# Patient Record
Sex: FEMALE | Race: WHITE
[De-identification: names, ages, dates, MRNs, and addresses within clinical notes are randomized per-mention and may not be internally consistent; named-entity substitution may affect disease eponyms.]

---

## 2020-08-27 ENCOUNTER — HOSPITAL ENCOUNTER (OUTPATIENT)
Dept: HOSPITAL 92 - TBSIIMAG | Age: 65
Discharge: HOME | End: 2020-08-27
Attending: INTERNAL MEDICINE
Payer: MEDICARE

## 2020-08-27 DIAGNOSIS — R16.0: Primary | ICD-10-CM

## 2020-08-27 DIAGNOSIS — K76.89: ICD-10-CM

## 2020-08-27 DIAGNOSIS — K86.89: ICD-10-CM

## 2020-08-27 DIAGNOSIS — N28.1: ICD-10-CM

## 2020-08-27 PROCEDURE — 74183 MRI ABD W/O CNTR FLWD CNTR: CPT

## 2020-08-27 NOTE — MRI
MRI OF THE ABDOMEN WITH AND WITHOUT CONTRAST:



INDICATION: 

History of abnormal CT examination.



COMPARISON:

Prior CT the abdomen and pelvis dated August 14, 2020.



TECHNIQUE: 

Multiplanar multisequence MR images were obtained of the abdomen utilizing a liver mass protocol.



TECHNIQUE:

Patient received 12 cc of MultiHance.



FINDINGS:

Small hypodensity within the left hepatic lobe demonstrates T2 hypointensity and T1 hypointensity wit
hout appreciable enhancement consistent with a tiny cyst. No additional focal hepatic lesion is

evident. Gallbladder is normal-appearing. There is a prominent cystic mass centered within the superi
or margin of the pancreatic head measuring 2.5 x 1.5 cm. The cystic mass appears to communicate

with the distal common bile duct, particularly on image 28 and 27 of series 13. There are some mild e
nhancing internal septations. No suspicious nodular enhancement is evident. No main pancreatic

ductal dilatation is evident. No lymphadenopathy is noted. The spleen is normal-appearing. No marrow 
signal abnormality is evident. Motion artifact limits evaluation of the coronal postcontrast

series. No free fluid is identified. There is a very tiny cysts within both kidneys.



IMPRESSION:

1. Multiloculated cystic abnormality in the pancreatic head appears to communicate with the distal co
mmon bile duct and is suspicious for choledochal cyst. Alternatively differential considerations

include a small serous cystadenoma or possibly an IPMN tumor. Would recommend consideration for ERCP 
to see if this truly does communicate with the distal common bile duct.

2. Small left hepatic lobe cyst.

3. Small bilateral renal cysts.



Transcribed Date/Time: 8/27/2020 11:09 AM



Reported By: Tayo Jaime 

Electronically Signed:  8/27/2020 3:46 PM

## 2021-04-08 ENCOUNTER — HOSPITAL ENCOUNTER (OUTPATIENT)
Dept: HOSPITAL 92 - CSHMRI | Age: 66
Discharge: HOME | End: 2021-04-08
Attending: INTERNAL MEDICINE
Payer: MEDICARE

## 2021-04-08 DIAGNOSIS — K86.2: Primary | ICD-10-CM

## 2021-04-08 LAB — ESTIMATED GFR-MDRD - POC: 72

## 2021-04-08 PROCEDURE — 74183 MRI ABD W/O CNTR FLWD CNTR: CPT

## 2021-04-08 PROCEDURE — 82565 ASSAY OF CREATININE: CPT

## 2022-01-10 ENCOUNTER — HOSPITAL ENCOUNTER (OUTPATIENT)
Dept: HOSPITAL 92 - LABBT | Age: 67
Discharge: HOME | End: 2022-01-10
Attending: PHYSICIAN ASSISTANT
Payer: MEDICARE

## 2022-01-10 DIAGNOSIS — Z20.822: ICD-10-CM

## 2022-01-10 DIAGNOSIS — K21.9: ICD-10-CM

## 2022-01-10 DIAGNOSIS — Z01.812: Primary | ICD-10-CM

## 2022-01-10 DIAGNOSIS — K22.4: ICD-10-CM

## 2022-01-10 DIAGNOSIS — K86.89: ICD-10-CM

## 2022-01-10 PROCEDURE — U0005 INFEC AGEN DETEC AMPLI PROBE: HCPCS

## 2022-01-10 PROCEDURE — U0003 INFECTIOUS AGENT DETECTION BY NUCLEIC ACID (DNA OR RNA); SEVERE ACUTE RESPIRATORY SYNDROME CORONAVIRUS 2 (SARS-COV-2) (CORONAVIRUS DISEASE [COVID-19]), AMPLIFIED PROBE TECHNIQUE, MAKING USE OF HIGH THROUGHPUT TECHNOLOGIES AS DESCRIBED BY CMS-2020-01-R: HCPCS

## 2022-01-14 ENCOUNTER — HOSPITAL ENCOUNTER (OUTPATIENT)
Dept: HOSPITAL 92 - RAD | Age: 67
Discharge: HOME | End: 2022-01-14
Attending: PHYSICIAN ASSISTANT
Payer: MEDICARE

## 2022-01-14 DIAGNOSIS — K86.89: ICD-10-CM

## 2022-01-14 DIAGNOSIS — K21.9: Primary | ICD-10-CM

## 2022-01-14 DIAGNOSIS — K22.4: ICD-10-CM

## 2022-01-14 PROCEDURE — 74220 X-RAY XM ESOPHAGUS 1CNTRST: CPT

## 2022-12-13 ENCOUNTER — HOSPITAL ENCOUNTER (OUTPATIENT)
Dept: HOSPITAL 92 - CSHSDC | Age: 67
Discharge: HOME | End: 2022-12-13
Attending: SURGERY
Payer: MEDICARE

## 2022-12-13 VITALS — BODY MASS INDEX: 24.3 KG/M2

## 2022-12-13 DIAGNOSIS — Z91.040: ICD-10-CM

## 2022-12-13 DIAGNOSIS — F41.9: ICD-10-CM

## 2022-12-13 DIAGNOSIS — K21.9: ICD-10-CM

## 2022-12-13 DIAGNOSIS — F32.A: ICD-10-CM

## 2022-12-13 DIAGNOSIS — K31.A11: ICD-10-CM

## 2022-12-13 DIAGNOSIS — Z79.899: ICD-10-CM

## 2022-12-13 DIAGNOSIS — K29.70: Primary | ICD-10-CM

## 2022-12-13 DIAGNOSIS — E78.00: ICD-10-CM

## 2022-12-13 DIAGNOSIS — Z87.891: ICD-10-CM

## 2022-12-13 DIAGNOSIS — Z88.2: ICD-10-CM

## 2022-12-13 PROCEDURE — 0DB58ZX EXCISION OF ESOPHAGUS, VIA NATURAL OR ARTIFICIAL OPENING ENDOSCOPIC, DIAGNOSTIC: ICD-10-PCS | Performed by: SURGERY

## 2022-12-13 PROCEDURE — 88305 TISSUE EXAM BY PATHOLOGIST: CPT

## 2022-12-13 PROCEDURE — 0DB68ZX EXCISION OF STOMACH, VIA NATURAL OR ARTIFICIAL OPENING ENDOSCOPIC, DIAGNOSTIC: ICD-10-PCS | Performed by: SURGERY

## 2022-12-13 PROCEDURE — 0D758ZZ DILATION OF ESOPHAGUS, VIA NATURAL OR ARTIFICIAL OPENING ENDOSCOPIC: ICD-10-PCS | Performed by: SURGERY

## 2023-03-30 ENCOUNTER — HOSPITAL ENCOUNTER (OUTPATIENT)
Dept: HOSPITAL 92 - CSHMRI | Age: 68
Discharge: HOME | End: 2023-03-30
Attending: PHYSICIAN ASSISTANT
Payer: MEDICARE

## 2023-03-30 DIAGNOSIS — K86.2: Primary | ICD-10-CM

## 2023-03-30 LAB — EGFRCR SERPLBLD CKD-EPI 2021: 61 ML/MIN/{1.73_M2}

## 2023-03-30 PROCEDURE — 82565 ASSAY OF CREATININE: CPT

## 2023-03-30 PROCEDURE — 74183 MRI ABD W/O CNTR FLWD CNTR: CPT

## 2025-01-31 ENCOUNTER — HOSPITAL ENCOUNTER (OUTPATIENT)
Dept: HOSPITAL 92 - CSHMRI | Age: 70
Discharge: HOME | End: 2025-01-31
Attending: PHYSICIAN ASSISTANT
Payer: MEDICARE

## 2025-01-31 DIAGNOSIS — K86.2: ICD-10-CM

## 2025-01-31 DIAGNOSIS — K59.00: Primary | ICD-10-CM

## 2025-01-31 DIAGNOSIS — K22.4: ICD-10-CM

## 2025-01-31 LAB — EGFRCR SERPLBLD CKD-EPI 2021: 60 ML/MIN/{1.73_M2}

## 2025-01-31 PROCEDURE — 74183 MRI ABD W/O CNTR FLWD CNTR: CPT

## 2025-01-31 PROCEDURE — 82565 ASSAY OF CREATININE: CPT
